# Patient Record
(demographics unavailable — no encounter records)

---

## 2025-06-11 NOTE — HISTORY OF PRESENT ILLNESS
[de-identified] : This is a 61yo male presenting to the office c/o ongoing right shoulder pain. Pt had a fall 2 weeks ago. Pt reports the shoulder wasn't painful up until this recent fall. Pt works a physical job but has had to cut back since the injury. Pt has an MRI from [facility]. Patient has pain with motion overhead. Pain is described as constant, severe in quality. Currently pain is 7/10 and non-radiating. Patient reports pain has been getting progressively worse. Pain is worse at night. Overall pain does improve with rest and ice.  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

## 2025-06-11 NOTE — PHYSICAL EXAM
[de-identified] : Constitutional: The general appearance of the patient is well developed, well nourished, no deformities and well groomed. Normal   Gait: Gait and function is as follows: normal gait.   Skin: Head and neck visualized skin is normal. Left upper extremity visualized skin is normal. Right upper extremity visualized skin is normal. Thoracic Skin of the thoracic spine shows visualized skin is normal.   Cardiovascular: palpable radial pulse bilaterally, good capillary refill in digits of the bilateral upper extremities and no temperature or color changes in the bilateral upper extremities.   Lymphatic: Normal Palpation of lymph nodes in the cervical.   Neurologic: fine motor control in the bilateral upper extremities is intact. Deep Tendon Reflexes in Upper and Lower Extremities Negative Jones's in the bilateral upper extremities. The patient is oriented to time, place and person. Sensation to light touch intact in the bilateral upper extremities. Mood and Affect is normal.   Right Shoulder: Inspection of the shoulder/upper arm is as follows: no scapula winging, no biceps deformity and no AC joint deformity. Palpation of the shoulder/upper arm is as follows: There is tenderness at the proximal biceps tendon. Range of motion of the shoulder is as follows: Pain with internal rotation, external rotation, abduction and forward flexion. Strength of the shoulder is as follows: Supraspinatus 4/5. External Rotation 4/5. Internal Rotation 4/5. Deltoid 5/5 Ligament Stability and Special Tests of the shoulder is as follows: Neer test is positive. Gutierrez' test is positive. Speed's test is positive   Left Shoulder: Inspection of the shoulder/upper arm is as follows: There is mild pain with range of motion of the shoulder range of motion of the shoulder    Neck:   Inspection / Palpation of the cervical spine is as follows: mild paracervical tenderness. Range of motion of the cervical spine is as follows: moderately decreased range of motion of the cervical spine. Stability testing for the cervical spine is as follows Stable range of motion.   Back, including spine: Inspection / Palpation of the thoracic/lumbar spine is as follows: There is a full, pain free, stable range of motion of the thoracic spine with a normal tone and not tenderness to palpation..

## 2025-06-11 NOTE — DATA REVIEWED
[FreeTextEntry1] : MRI right shoulder [facility] 5/30/25 full thickness supraspinatus tear with 4 cm of retraction with atrophy

## 2025-06-11 NOTE — DISCUSSION/SUMMARY
[de-identified] : This is a 61yo male presenting to the office c/o ongoing right shoulder pain. I personally reviewed MRI which demonstrates full thickness supraspinatus tear with 4 cm of retraction with atrophy which most likely is an acute on chronic rotator cuff tear Based on the chronic changes in the humeral head as well as the amount of retraction and atrophy, this is likely an irreparable tear. Supine FF and deltoid negatives demonstrated to maximize range of motion  Physical therapy script given  Pt was treated with a subacromial injection for diagnostic and therapeutic purposes  follow up 4-6 weeks  Recommended a Medrol Dosepak and discussed the dosing schedule and side effects of prednisone. We also recommend the patient contact their primary care physician to discuss any potential interactions with their current medications or health issues.  We discussed that if patient is not improving with anti-inflammatories and activity modification, he would be a candidate for reverse total shoulder replacement and biceps tenodesis. We discussed the perioperative restrictions and recovery time for the surgery.        Attestation: I, Ayanna HUERTA'Hayde , attest that this documentation has been prepared under the direction and in the presence of Provider Ezekiel Cotto MD. The documentation recorded by the scribe, in my presence, accurately reflects the service I personally performed, and the decisions made by me with my edits as appropriate. Ezekiel Cotto MD

## 2025-06-20 NOTE — DATA REVIEWED
[FreeTextEntry1] : On my interpretation of these images from [facility] on 05/07/25. I have additionally reviewed the radiologist report. mri-  L5-S1: BL pars lysis of L5, grade 2 listhesis, severe BL FS, L5 root compression.  L4-5: adv DDD, retrolisthesis, mild stenosis BL.  L3-4: adv DDD, RT sided HNP, mod RT sided lateral recess stenosis.  L2-3: adv DDD, mild FS BL.  L1-2: adv DDD, broad based protrusion, mild central stenosis.  T12-L1: adv DDD, mild stenosis.    I have independently reviewed and interpreted these images and reports. [facility] 05/27/25 Lumbar XR: L5-S1 grade 1/2 listhesis, mod DDD from T12-L3, and mod-ad DDD of L3-L5.

## 2025-06-20 NOTE — DISCUSSION/SUMMARY
[de-identified] : 62 Y M w/ lumbar HNP & spondylolisthesis, & severe BL FS. Intermittent radiculopathy. Symptoms stable at this time.  [facility] MRI reviewed & discussed with patient today.  Patient was educated and informed on their condition along with the expected outcomes. All questions answered.   We've discussed ALL appropriate treatment options including- non-operative measures (NSAIDs, acupuncture, chiropractic care, physical therapy, nerve modulators & spine ESIs) and operative measures, if refractory. Potential candidate for a L5-S1 PSF/TLIF but he is currently only mildly symptomatic, if this is something overtime that c/t to be bothersome, we can refer to pain mgmt and potentially treat with surgical intervention.    Moving forward I'd like to see as needed.   Prior to appointment and during encounter with patient extensive medical records were reviewed including but not limited to, hospital records, out patient records, imaging results, and lab data. During this appointment the patient was examined, diagnoses were discussed and explained in a face to face manner. In addition extensive time was spent reviewing aforementioned diagnostic studies. Counseling including abnormal image results, differential diagnoses, treatment options, risk and benefits, lifestyle changes, current condition, and current medications was performed. Patient's comments, questions, and concerns were address and patient verbalized understanding. Based on this patient's presentation at our office, which is an orthopedic spine surgeon's office, this patient inherently / intrinsically has a risk, however minute, of developing issues such as Cauda equina syndrome, bowel and bladder changes, or progression of motor or neurological deficits such as paralysis which may be permanent.     I, Bella Gee, attest that this documentation has been prepared under the direction and in the presence of provider Ajit Humphreys MD.

## 2025-06-20 NOTE — PHYSICAL EXAM
[de-identified] : Constitutional: - General Appearance: Unremarkable Body Habitus Well Developed Well Nourished Body Habitus No Deformities Well Groomed Ability To communicate: Normal Neurologic: Global sensation is intact to upper and lower extremities. See examination of Neck and/or Spine for exceptions. Orientation to Time, Place and Person is: Normal Mood And Affect is Normal Skin: - Head/Face, Right Upper/Lower Extremity, Left Upper/Lower Extremity: Normal See Examination of Neck and/or Spine for exceptions Cardiovascular: Peripheral Cardiovascular System is Normal Palpation of Lymph Nodes: Normal Palpation of lymph nodes in: Axilla, Cervical, Inguinal Abnormal Palpation of lymph nodes in: None  [] : non-antalgic [FreeTextEntry9] : extends mostly in knees & hips.  [de-identified] : L>R patricio.  [TWNoteComboBox7] : forward flexion 75 degrees [de-identified] : extension 5 degrees

## 2025-06-20 NOTE — HISTORY OF PRESENT ILLNESS
[2] : 2 [0] : 0 [Dull/Aching] : dull/aching [Radiating] : radiating [Intermittent] : intermittent [Rest] : rest [Meds] : meds [Injection therapy] : injection therapy [Full time] : Work status: full time [de-identified] : 06/20/2025: 62 Y M presenting today for an initial evaluation. C/o LT sided lower back pains, radiating to LT leg with intermittent numbness. Numbness is present every few weeks and lasts about 8 minutes in duration to resolve.   Ongoing tightness in back for about 15-20 years. Severity of symptoms started in Feb/March 2025. He was having a spasm in back, describes a "knot" sensation in LT side to back & referred to pain mgmt.  One episode a year. Watches pitched forward to alleviate back pains. He has attended lumbar PT which provided no specific relief. Relief with Robaxin, takes sparingly. TPI done by neurologist on one occasion which did provide 80% relief. PMHx: heart disease, 4 clogged arteries, baby aspirin, Page's esophagus, alcoholism, & R shoulder condition. Chief complaint is LT sided back pain.  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

## 2025-07-23 NOTE — DISCUSSION/SUMMARY
[de-identified] : This is a 62yo male presenting to the office c/o ongoing right shoulder pain. I personally reviewed MRI which demonstrates full thickness supraspinatus tear with 4 cm of retraction with atrophy which most likely is an acute on chronic rotator cuff tear. Based on the chronic changes in the humeral head as well as the amount of retraction and atrophy, this is likely an irreparable tear.  Patient will continue to progress with a home exercise program as tolerated  Initial subacromial injection provided mild but transient relief  If pain continues we discussed a CSI follow up 2 months  We discussed that if patient is not improving with anti-inflammatories and activity modification, he would be a candidate for reverse total shoulder replacement and biceps tenodesis. We discussed the perioperative restrictions and recovery time for the surgery.       Attestation: I, Ayanna HUERTA'Hayde , attest that this documentation has been prepared under the direction and in the presence of Provider Ezekiel Cotto MD. The documentation recorded by the scribe, in my presence, accurately reflects the service I personally performed, and the decisions made by me with my edits as appropriate. Ezekiel Cotto MD

## 2025-07-23 NOTE — PHYSICAL EXAM
[de-identified] : Constitutional: The general appearance of the patient is well developed, well nourished, no deformities and well groomed. Normal   Gait: Gait and function is as follows: normal gait.   Skin: Head and neck visualized skin is normal. Left upper extremity visualized skin is normal. Right upper extremity visualized skin is normal. Thoracic Skin of the thoracic spine shows visualized skin is normal.   Cardiovascular: palpable radial pulse bilaterally, good capillary refill in digits of the bilateral upper extremities and no temperature or color changes in the bilateral upper extremities.   Lymphatic: Normal Palpation of lymph nodes in the cervical.   Neurologic: fine motor control in the bilateral upper extremities is intact. Deep Tendon Reflexes in Upper and Lower Extremities Negative Jones's in the bilateral upper extremities. The patient is oriented to time, place and person. Sensation to light touch intact in the bilateral upper extremities. Mood and Affect is normal.   Right Shoulder: Inspection of the shoulder/upper arm is as follows: no scapula winging, no biceps deformity and no AC joint deformity. Palpation of the shoulder/upper arm is as follows: There is tenderness at the proximal biceps tendon. Range of motion of the shoulder is as follows: Pain with internal rotation, external rotation, abduction and forward flexion. Strength of the shoulder is as follows: Supraspinatus 4/5. External Rotation 4/5. Internal Rotation 4/5. Deltoid 5/5 Ligament Stability and Special Tests of the shoulder is as follows: Neer test is positive. Gutierrez' test is positive. Speed's test is positive   Left Shoulder: Inspection of the shoulder/upper arm is as follows: There is mild pain with range of motion of the shoulder range of motion of the shoulder    Neck:   Inspection / Palpation of the cervical spine is as follows: mild paracervical tenderness. Range of motion of the cervical spine is as follows: moderately decreased range of motion of the cervical spine. Stability testing for the cervical spine is as follows Stable range of motion.   Back, including spine: Inspection / Palpation of the thoracic/lumbar spine is as follows: There is a full, pain free, stable range of motion of the thoracic spine with a normal tone and not tenderness to palpation..

## 2025-07-23 NOTE — HISTORY OF PRESENT ILLNESS
[de-identified] : This is a 61yo male presenting to the office c/o ongoing right shoulder pain. Pt had a fall 2 weeks ago. Pt reports the shoulder wasn't painful up until this recent fall. Pt works a physical job but has had to cut back since the injury. Pt has an MRI from [facility]. Patient has pain with motion overhead. Pain is described as constant, severe in quality. Currently pain is 7/10 and non-radiating. Patient reports pain has been getting progressively worse. Pain is worse at night. Overall pain does improve with rest and ice.  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 Patient admits to slow but steady progress with respect to range of motion. Pt is doing a home exercise program. Pt reports initial subacromial injection provided mild but transient relief. Pt admits to retroscapular discomfort at times.